# Patient Record
Sex: FEMALE | URBAN - METROPOLITAN AREA
[De-identification: names, ages, dates, MRNs, and addresses within clinical notes are randomized per-mention and may not be internally consistent; named-entity substitution may affect disease eponyms.]

---
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
## 2024-05-16 ENCOUNTER — EMERGENCY (EMERGENCY)
Facility: HOSPITAL | Age: 19
LOS: 0 days | Discharge: HOME | End: 2024-05-16
Attending: EMERGENCY MEDICINE
Payer: MEDICAID

## 2024-05-16 VITALS
HEIGHT: 60 IN | SYSTOLIC BLOOD PRESSURE: 115 MMHG | DIASTOLIC BLOOD PRESSURE: 80 MMHG | TEMPERATURE: 98 F | WEIGHT: 145.06 LBS | RESPIRATION RATE: 18 BRPM | OXYGEN SATURATION: 100 % | HEART RATE: 72 BPM

## 2024-05-16 DIAGNOSIS — Z98.890 OTHER SPECIFIED POSTPROCEDURAL STATES: Chronic | ICD-10-CM

## 2024-05-16 DIAGNOSIS — H57.89 OTHER SPECIFIED DISORDERS OF EYE AND ADNEXA: ICD-10-CM

## 2024-05-16 DIAGNOSIS — K21.9 GASTRO-ESOPHAGEAL REFLUX DISEASE WITHOUT ESOPHAGITIS: ICD-10-CM

## 2024-05-16 PROCEDURE — 99283 EMERGENCY DEPT VISIT LOW MDM: CPT

## 2024-05-16 RX ORDER — HYDROCORTISONE 1 %
1 OINTMENT (GRAM) TOPICAL
Qty: 1 | Refills: 0
Start: 2024-05-16 | End: 2024-05-29

## 2024-05-16 NOTE — ED PROVIDER NOTE - PHYSICAL EXAMINATION
VITAL SIGNS: I have reviewed nursing notes and confirm.  CONSTITUTIONAL: Well-developed; well-nourished; in no acute distress.  SKIN: Skin exam is warm and dry, no acute rash.  HEAD: Normocephalic; atraumatic.  EYES: PERRL, EOM intact; conjunctiva with minimal erythema b/l. No crusting, no discharge.  ENT: airway clear. TMs clear.  NECK: Supple  CARD: S1, S2 normal; no murmurs, gallops, or rubs. Regular rate and rhythm.  RESP: No wheezes, rales or rhonchi.  ABD: Normal bowel sounds; soft; non-distended; non-tender  EXT: Normal ROM.   NEURO: Alert, oriented.   PSYCH: Cooperative, appropriate.

## 2024-05-16 NOTE — ED PROVIDER NOTE - PATIENT PORTAL LINK FT
You can access the FollowMyHealth Patient Portal offered by [facility] by registering at the following website: [email or web address]. By joining [employer]’s FollowMyHealth portal, you will also be able to view your health information using other applications (apps) compatible with our system.

## 2024-05-16 NOTE — ED PROVIDER NOTE - OBJECTIVE STATEMENT
17 y/o female with h/o GERD presents to ED with complaints of b/l eye redness. Patient reports symptoms for the past few days, associated with itching, increased tearing. Unknown allergies. No new exposures to soaps, detergents, clothing, or foods. No known sick contacts. No fevers, cough, congestion, sore throat, sob, nausea, or vomiting.

## 2024-05-16 NOTE — ED PROVIDER NOTE - NSFOLLOWUPINSTRUCTIONS_ED_ALL_ED_FT
AMBULATORY CARE:    Allergic rhinitis, or hay fever, is swelling inside your nose caused by an allergen. An allergen can be anything that causes an allergic reaction. Allergies to weeds, grass, trees, or mold often cause seasonal allergic rhinitis. Indoor dust mites or pet dander can also cause allergic rhinitis.    Common signs and symptoms:    Sneezing, coughing, or clearing your throat often    Runny, stuffy, or itchy nose    A sore or scratchy throat    Red, itchy, watery eyes    Severe tiredness    Dark circles under your eyes    Rash or hives    Headache    Postnasal drip (nasal drainage down the back of your throat)  Call your local emergency number (911 in the ) or have someone call if: You have any of the following signs or symptoms of an anaphylactic reaction:    You feel itchy, or you have a rash, or hives that have spread over your body.    You have trouble breathing, swelling in your mouth or throat, or wheezing.    You feel you are going to faint.  Call your doctor if:    You have a fever.    Your symptoms get worse, even after treatment.    You have trouble sleeping because of your symptoms.    You have questions or concerns about your condition or care.  Treatment: You may need any of the following:    Medicines may help decrease your symptoms. Examples include antihistamines, decongestants, and certain steroids or asthma medicines. These may come as a pill, eye drops, nasal spray, or a tablet to put under your tongue.    Allergy shots, or immunotherapy, may be needed if your symptoms are severe or other treatments do not work. At first, tiny amounts of an allergen are injected into your skin. The amount of allergen is slowly increased over time. This may help your body be less sensitive to the allergen and stop reacting to it. You may need allergy shots for weeks or longer.  Manage allergic rhinitis:    Rinse your nose and sinuses with a salt water spray or solution. This will help thin the mucus and decrease swelling in your nose. This will also rinse away pollen and dirt. Ask your provider how often to rinse your nose.    Use over-the-counter eye drops if you have red, itchy eyes. Ask what eye drops you should use and how often you should use them.    Do not smoke and avoid secondhand smoke. Nicotine and other chemicals in cigarettes and cigars can cause lung damage. Ask your healthcare provider for information if you currently smoke and need help to quit. E-cigarettes or smokeless tobacco still contain nicotine. Talk to your healthcare provider before you use these products.  Prevent an allergic reaction: The best way to prevent an allergic reaction is to avoid allergens as much as possible. Any of the following may help decrease your symptoms:    Decrease exposure to dust mites. Wash sheets, towels, and blankets in hot water regularly. Cover your pillows and mattresses with allergen-free covers. Store clothes in closets with doors or closed drawers. Vacuum often. Remove carpets and curtains if possible. These collect dust and dust mites.    Decrease exposure to pollen. Stay inside when air pollution or the pollen count is high. Use an air conditioner and keep windows and doors closed. Add a filter designed for allergies if possible. Wash your hair before bed every night to rinse away pollen.    Decrease exposure to pet dander. If you have pets, try to keep them out of bedrooms and carpeted areas. Bathe pets often, if appropriate.    Decrease exposure to mold. Limit the time you spend in basements. Do not have standing water in your home or yard.  Follow up with your doctor as directed:You may need to see an allergy specialist if you need help to control your symptoms. Write down your questions so you remember to ask them during your visits.

## 2024-05-16 NOTE — ED PROVIDER NOTE - CLINICAL SUMMARY MEDICAL DECISION MAKING FREE TEXT BOX
Patient presented with b/l eye redness and itching with periorbital swelling. (+) hx of this occurring in the past although exact allergen is unknown. Otherwise afebrile, HD stable, PERRL, EOMI, no pain with EOM, (+) minimal conjunctival injection but no discharge/crusting. Likely allergy-related. Given the above, will discharge home with short course of steroids, outpatient allergy follow up. Patient and family at bedside agreeable with plan. Agrees to return to ED for any new or worsening symptoms.

## 2024-06-18 ENCOUNTER — APPOINTMENT (OUTPATIENT)
Dept: PEDIATRIC ALLERGY IMMUNOLOGY | Facility: CLINIC | Age: 19
End: 2024-06-18

## 2024-06-18 VITALS
DIASTOLIC BLOOD PRESSURE: 60 MMHG | SYSTOLIC BLOOD PRESSURE: 110 MMHG | HEIGHT: 63 IN | WEIGHT: 149 LBS | BODY MASS INDEX: 26.4 KG/M2

## 2024-06-18 DIAGNOSIS — D89.89 OTHER SPECIFIED DISORDERS INVOLVING THE IMMUNE MECHANISM, NOT ELSEWHERE CLASSIFIED: ICD-10-CM

## 2024-06-18 DIAGNOSIS — L25.9 UNSPECIFIED CONTACT DERMATITIS, UNSPECIFIED CAUSE: ICD-10-CM

## 2024-06-18 RX ORDER — TRIAMCINOLONE ACETONIDE 1 MG/G
0.1 CREAM TOPICAL
Refills: 0 | Status: ACTIVE | COMMUNITY

## 2024-06-18 RX ORDER — HYDROCORTISONE 25 MG/G
2.5 CREAM TOPICAL
Refills: 0 | Status: ACTIVE | COMMUNITY

## 2024-06-18 RX ORDER — TACROLIMUS 0.1% IN PSEUDOCATALASE 0.1 G/100G
0.1 CREAM TOPICAL
Refills: 0 | Status: ACTIVE | COMMUNITY

## 2024-06-18 RX ORDER — RUXOLITINIB 15 MG/G
1.5 CREAM TOPICAL
Refills: 0 | Status: ACTIVE | COMMUNITY

## 2024-06-18 RX ORDER — DESONIDE 0.5 MG/G
0.05 CREAM TOPICAL
Refills: 0 | Status: ACTIVE | COMMUNITY

## 2024-06-18 RX ORDER — GENTAMICIN SULFATE 3 MG/ML
0.3 SOLUTION OPHTHALMIC
Refills: 0 | Status: ACTIVE | COMMUNITY

## 2024-06-18 RX ORDER — CICLOPIROX 10 MG/.96ML
1 SHAMPOO TOPICAL
Refills: 0 | Status: ACTIVE | COMMUNITY

## 2024-06-18 RX ORDER — CLOBETASOL PROPIONATE 0.5 MG/ML
0.05 SOLUTION TOPICAL
Refills: 0 | Status: ACTIVE | COMMUNITY

## 2024-06-18 NOTE — ASSESSMENT
[FreeTextEntry1] : 1. Rash - negative to all environmental and food allergy testing, positive on contact patch to nickel and methylisothiazolinone - recommend continued derm follow up.

## 2024-06-18 NOTE — SOCIAL HISTORY
[House] : [unfilled] lives in a house  [Radiator/Baseboard] : heating provided by radiator(s)/baseboard(s) [Window Units] : air conditioning provided by window units [Dry] : dry [None] : none [Humidifier] : does not use a humidifier [Dehumidifier] : does not use a dehumidifier [Cockroaches] : Patient states that there are no cockroaches in the home [Dust Mite Covers] : does not have dust mite covers [Feather Pillows] : does not have feather pillows [Feather Comforter] : does not have a feather comforter [Bedroom] : not in the bedroom [Basement] : not in the basement [Living Area] : not in the living area [Smokers in Household] : there are no smokers in the home [de-identified] : n/a

## 2024-06-18 NOTE — HISTORY OF PRESENT ILLNESS
[Asthma] : asthma [Venom Reactions] : venom reactions [Food Allergies] : food allergies [Drug Allergies] : drug allergies [de-identified] : PAULINA MURPHY  is a 18 year old female who has a history of eczema and seasonal allergies, two months ago she began to break out in rashes all over her face, she says this has happened before when the weather begins to change but she has n ever broken out in a rash on her face before, she says it is very itchy and has a burning sensation, it flared up on her scalp, ears and back, she has been applying steroid creams but they have not been working, some only help for two weeks but her rash flares up again. This spring season she was experiencing swollen, itchy eyes, and itchy nose. She had blood work done in April 2024 and had allergy testing done a month ago, she is here today to know how to better treat her symptoms.

## 2025-03-20 ENCOUNTER — APPOINTMENT (OUTPATIENT)
Dept: ORTHOPEDIC SURGERY | Facility: CLINIC | Age: 20
End: 2025-03-20
Payer: MEDICAID

## 2025-03-20 VITALS — HEIGHT: 65 IN | BODY MASS INDEX: 24.16 KG/M2 | WEIGHT: 145 LBS

## 2025-03-20 DIAGNOSIS — S83.511A SPRAIN OF ANTERIOR CRUCIATE LIGAMENT OF RIGHT KNEE, INITIAL ENCOUNTER: ICD-10-CM

## 2025-03-20 DIAGNOSIS — S89.91XA UNSPECIFIED INJURY OF RIGHT LOWER LEG, INITIAL ENCOUNTER: ICD-10-CM

## 2025-03-20 DIAGNOSIS — S83.206A UNSPECIFIED TEAR OF UNSPECIFIED MENISCUS, CURRENT INJURY, RIGHT KNEE, INITIAL ENCOUNTER: ICD-10-CM

## 2025-03-20 PROCEDURE — 73562 X-RAY EXAM OF KNEE 3: CPT | Mod: RT

## 2025-03-20 PROCEDURE — 99214 OFFICE O/P EST MOD 30 MIN: CPT

## 2025-04-08 ENCOUNTER — APPOINTMENT (OUTPATIENT)
Dept: ORTHOPEDIC SURGERY | Facility: CLINIC | Age: 20
End: 2025-04-08
Payer: MEDICAID

## 2025-04-08 ENCOUNTER — NON-APPOINTMENT (OUTPATIENT)
Age: 20
End: 2025-04-08

## 2025-04-08 DIAGNOSIS — S89.91XA UNSPECIFIED INJURY OF RIGHT LOWER LEG, INITIAL ENCOUNTER: ICD-10-CM

## 2025-04-08 PROCEDURE — 99213 OFFICE O/P EST LOW 20 MIN: CPT

## 2025-04-10 ENCOUNTER — APPOINTMENT (OUTPATIENT)
Dept: ORTHOPEDIC SURGERY | Facility: CLINIC | Age: 20
End: 2025-04-10